# Patient Record
Sex: FEMALE | Race: BLACK OR AFRICAN AMERICAN | Employment: OTHER | URBAN - METROPOLITAN AREA
[De-identification: names, ages, dates, MRNs, and addresses within clinical notes are randomized per-mention and may not be internally consistent; named-entity substitution may affect disease eponyms.]

---

## 2020-02-06 ENCOUNTER — HOSPITAL ENCOUNTER (INPATIENT)
Facility: HOSPITAL | Age: 80
LOS: 6 days | Discharge: NURSING, REHAB OR LONG-TERM CARE | DRG: 178 | End: 2020-02-12
Attending: EMERGENCY MEDICINE | Admitting: INTERNAL MEDICINE
Payer: MEDICARE

## 2020-02-06 ENCOUNTER — APPOINTMENT (OUTPATIENT)
Dept: GENERAL RADIOLOGY | Facility: HOSPITAL | Age: 80
DRG: 178 | End: 2020-02-06
Attending: EMERGENCY MEDICINE
Payer: MEDICARE

## 2020-02-06 DIAGNOSIS — J18.9 PNEUMONIA DUE TO INFECTIOUS ORGANISM, UNSPECIFIED LATERALITY, UNSPECIFIED PART OF LUNG: Primary | ICD-10-CM

## 2020-02-06 DIAGNOSIS — J18.9 PNEUMONIA OF RIGHT LOWER LOBE DUE TO INFECTIOUS ORGANISM: ICD-10-CM

## 2020-02-06 DIAGNOSIS — I95.9 HYPOTENSION, UNSPECIFIED HYPOTENSION TYPE: ICD-10-CM

## 2020-02-06 PROBLEM — M23.51: Status: ACTIVE | Noted: 2020-01-11

## 2020-02-06 PROBLEM — J47.9 BRONCHIECTASIS (HCC): Status: ACTIVE | Noted: 2019-08-08

## 2020-02-06 PROBLEM — Z78.9 IMPAIRED MOBILITY AND ADLS: Status: ACTIVE | Noted: 2017-04-19

## 2020-02-06 PROBLEM — F31.9 BIPOLAR I DISORDER (HCC): Status: ACTIVE | Noted: 2019-10-11

## 2020-02-06 PROBLEM — G47.30 SLEEP APNEA: Status: ACTIVE | Noted: 2018-02-22

## 2020-02-06 PROBLEM — Z74.09 IMPAIRED MOBILITY AND ADLS: Status: ACTIVE | Noted: 2017-04-19

## 2020-02-06 PROBLEM — Z95.810 AICD (AUTOMATIC CARDIOVERTER/DEFIBRILLATOR) PRESENT: Status: ACTIVE | Noted: 2017-04-19

## 2020-02-06 PROBLEM — M54.16 LUMBAR RADICULOPATHY: Status: ACTIVE | Noted: 2020-01-09

## 2020-02-06 PROBLEM — Z51.81 ANTICOAGULATION MANAGEMENT ENCOUNTER: Status: ACTIVE | Noted: 2017-04-17

## 2020-02-06 PROBLEM — Z90.2 S/P LOBECTOMY OF LUNG: Status: ACTIVE | Noted: 2017-04-14

## 2020-02-06 PROBLEM — R41.89 IMPAIRED COGNITION: Status: ACTIVE | Noted: 2018-05-03

## 2020-02-06 PROBLEM — I63.9 CVA (CEREBRAL VASCULAR ACCIDENT) (HCC): Status: ACTIVE | Noted: 2020-02-06

## 2020-02-06 PROBLEM — I48.91 ATRIAL FIBRILLATION (HCC): Status: ACTIVE | Noted: 2017-04-19

## 2020-02-06 PROBLEM — R05.9 COUGH: Status: ACTIVE | Noted: 2019-02-14

## 2020-02-06 PROBLEM — E78.5 HYPERLIPEMIA: Status: ACTIVE | Noted: 2017-04-19

## 2020-02-06 PROBLEM — N18.30 STAGE 3 CHRONIC KIDNEY DISEASE (HCC): Status: ACTIVE | Noted: 2020-01-14

## 2020-02-06 PROBLEM — K59.00 CONSTIPATION: Status: ACTIVE | Noted: 2019-02-14

## 2020-02-06 PROBLEM — C34.11 MALIGNANT NEOPLASM OF UPPER LOBE OF RIGHT LUNG (HCC): Status: ACTIVE | Noted: 2017-04-14

## 2020-02-06 PROBLEM — Z79.01 ANTICOAGULATION MANAGEMENT ENCOUNTER: Status: ACTIVE | Noted: 2017-04-17

## 2020-02-06 PROBLEM — F33.8 SEASONAL AFFECTIVE DISORDER (HCC): Status: ACTIVE | Noted: 2018-04-28

## 2020-02-06 PROBLEM — C34.90 PRIMARY LUNG ADENOCARCINOMA (HCC): Status: ACTIVE | Noted: 2017-04-17

## 2020-02-06 LAB
ALBUMIN SERPL-MCNC: 3.6 G/DL (ref 3.4–5)
ALBUMIN/GLOB SERPL: 1 {RATIO} (ref 1–2)
ALP LIVER SERPL-CCNC: 94 U/L (ref 55–142)
ALT SERPL-CCNC: 17 U/L (ref 13–56)
ANION GAP SERPL CALC-SCNC: 4 MMOL/L (ref 0–18)
AST SERPL-CCNC: 18 U/L (ref 15–37)
BASOPHILS # BLD AUTO: 0.03 X10(3) UL (ref 0–0.2)
BASOPHILS NFR BLD AUTO: 0.3 %
BILIRUB SERPL-MCNC: 0.8 MG/DL (ref 0.1–2)
BILIRUB UR QL STRIP.AUTO: NEGATIVE
BUN BLD-MCNC: 13 MG/DL (ref 7–18)
BUN/CREAT SERPL: 10.4 (ref 10–20)
CALCIUM BLD-MCNC: 9.4 MG/DL (ref 8.5–10.1)
CHLORIDE SERPL-SCNC: 107 MMOL/L (ref 98–112)
CLARITY UR REFRACT.AUTO: CLEAR
CO2 SERPL-SCNC: 26 MMOL/L (ref 21–32)
COLOR UR AUTO: YELLOW
CREAT BLD-MCNC: 1.25 MG/DL (ref 0.55–1.02)
DEPRECATED RDW RBC AUTO: 42.5 FL (ref 35.1–46.3)
EOSINOPHIL # BLD AUTO: 0.12 X10(3) UL (ref 0–0.7)
EOSINOPHIL NFR BLD AUTO: 1.2 %
ERYTHROCYTE [DISTWIDTH] IN BLOOD BY AUTOMATED COUNT: 13.5 % (ref 11–15)
GLOBULIN PLAS-MCNC: 3.6 G/DL (ref 2.8–4.4)
GLUCOSE BLD-MCNC: 132 MG/DL (ref 70–99)
GLUCOSE UR STRIP.AUTO-MCNC: >=500 MG/DL
HCT VFR BLD AUTO: 46.5 % (ref 35–48)
HGB BLD-MCNC: 15.2 G/DL (ref 12–16)
IMM GRANULOCYTES # BLD AUTO: 0.03 X10(3) UL (ref 0–1)
IMM GRANULOCYTES NFR BLD: 0.3 %
KETONES UR STRIP.AUTO-MCNC: NEGATIVE MG/DL
LACTATE SERPL-SCNC: 1.1 MMOL/L (ref 0.4–2)
LEUKOCYTE ESTERASE UR QL STRIP.AUTO: NEGATIVE
LITHIUM SERPL-SCNC: 1.8 MMOL/L (ref 0.6–1.2)
LYMPHOCYTES # BLD AUTO: 2.29 X10(3) UL (ref 1–4)
LYMPHOCYTES NFR BLD AUTO: 22 %
M PROTEIN MFR SERPL ELPH: 7.2 G/DL (ref 6.4–8.2)
MCH RBC QN AUTO: 28.1 PG (ref 26–34)
MCHC RBC AUTO-ENTMCNC: 32.7 G/DL (ref 31–37)
MCV RBC AUTO: 86.1 FL (ref 80–100)
MONOCYTES # BLD AUTO: 0.87 X10(3) UL (ref 0.1–1)
MONOCYTES NFR BLD AUTO: 8.3 %
NEUTROPHILS # BLD AUTO: 7.09 X10 (3) UL (ref 1.5–7.7)
NEUTROPHILS # BLD AUTO: 7.09 X10(3) UL (ref 1.5–7.7)
NEUTROPHILS NFR BLD AUTO: 67.9 %
NITRITE UR QL STRIP.AUTO: NEGATIVE
OSMOLALITY SERPL CALC.SUM OF ELEC: 286 MOSM/KG (ref 275–295)
PH UR STRIP.AUTO: 6 [PH] (ref 4.5–8)
PLATELET # BLD AUTO: 162 10(3)UL (ref 150–450)
POTASSIUM SERPL-SCNC: 4.2 MMOL/L (ref 3.5–5.1)
PROCALCITONIN SERPL-MCNC: 0.12 NG/ML
PROT UR STRIP.AUTO-MCNC: NEGATIVE MG/DL
RBC # BLD AUTO: 5.4 X10(6)UL (ref 3.8–5.3)
RBC UR QL AUTO: NEGATIVE
SODIUM SERPL-SCNC: 137 MMOL/L (ref 136–145)
SP GR UR STRIP.AUTO: 1.02 (ref 1–1.03)
TROPONIN I SERPL-MCNC: <0.045 NG/ML (ref ?–0.04)
UROBILINOGEN UR STRIP.AUTO-MCNC: <2 MG/DL
WBC # BLD AUTO: 10.4 X10(3) UL (ref 4–11)

## 2020-02-06 PROCEDURE — 71045 X-RAY EXAM CHEST 1 VIEW: CPT | Performed by: EMERGENCY MEDICINE

## 2020-02-06 RX ORDER — PRAVASTATIN SODIUM 80 MG/1
80 TABLET ORAL NIGHTLY
Status: ON HOLD | COMMUNITY
End: 2020-02-10

## 2020-02-06 RX ORDER — LOSARTAN POTASSIUM 100 MG/1
100 TABLET ORAL DAILY
Status: ON HOLD | COMMUNITY
End: 2020-02-10

## 2020-02-06 RX ORDER — DIPHENHYDRAMINE HYDROCHLORIDE 50 MG/ML
25 INJECTION INTRAMUSCULAR; INTRAVENOUS EVERY 6 HOURS PRN
Status: DISCONTINUED | OUTPATIENT
Start: 2020-02-06 | End: 2020-02-12

## 2020-02-06 RX ORDER — MONTELUKAST SODIUM 10 MG/1
10 TABLET ORAL NIGHTLY
Status: DISCONTINUED | OUTPATIENT
Start: 2020-02-06 | End: 2020-02-12

## 2020-02-06 RX ORDER — SOTALOL HYDROCHLORIDE 80 MG/1
80 TABLET ORAL 2 TIMES DAILY
Status: DISCONTINUED | OUTPATIENT
Start: 2020-02-06 | End: 2020-02-10

## 2020-02-06 RX ORDER — FLUTICASONE PROPIONATE 50 MCG
1 SPRAY, SUSPENSION (ML) NASAL NIGHTLY
Status: ON HOLD | COMMUNITY
End: 2020-02-12

## 2020-02-06 RX ORDER — ONDANSETRON 4 MG/1
4 TABLET, ORALLY DISINTEGRATING ORAL EVERY 8 HOURS PRN
Status: DISCONTINUED | OUTPATIENT
Start: 2020-02-06 | End: 2020-02-06

## 2020-02-06 RX ORDER — CEFUROXIME AXETIL 500 MG/1
TABLET ORAL 2 TIMES DAILY
COMMUNITY
End: 2020-02-06

## 2020-02-06 RX ORDER — PRAVASTATIN SODIUM 10 MG
10 TABLET ORAL NIGHTLY
Status: DISCONTINUED | OUTPATIENT
Start: 2020-02-06 | End: 2020-02-12

## 2020-02-06 RX ORDER — ACETAMINOPHEN 325 MG/1
650 TABLET ORAL EVERY 6 HOURS PRN
Status: DISCONTINUED | OUTPATIENT
Start: 2020-02-06 | End: 2020-02-12

## 2020-02-06 RX ORDER — CARVEDILOL 12.5 MG/1
25 TABLET ORAL 2 TIMES DAILY WITH MEALS
Status: DISCONTINUED | OUTPATIENT
Start: 2020-02-07 | End: 2020-02-07

## 2020-02-06 RX ORDER — L.ACID,PARA/B.BIFIDUM/S.THERM 8B CELL
1 CAPSULE ORAL DAILY
Status: ON HOLD | COMMUNITY
End: 2020-02-12

## 2020-02-06 RX ORDER — LITHIUM CARBONATE 450 MG
450 TABLET, EXTENDED RELEASE ORAL 2 TIMES DAILY
Status: ON HOLD | COMMUNITY
End: 2020-02-10

## 2020-02-06 RX ORDER — DILTIAZEM HYDROCHLORIDE 180 MG/1
180 CAPSULE, EXTENDED RELEASE ORAL DAILY
COMMUNITY

## 2020-02-06 RX ORDER — ONDANSETRON 2 MG/ML
4 INJECTION INTRAMUSCULAR; INTRAVENOUS EVERY 6 HOURS PRN
Status: DISCONTINUED | OUTPATIENT
Start: 2020-02-06 | End: 2020-02-06

## 2020-02-06 RX ORDER — SENNA AND DOCUSATE SODIUM 50; 8.6 MG/1; MG/1
1 TABLET, FILM COATED ORAL DAILY
COMMUNITY

## 2020-02-06 RX ORDER — CARVEDILOL 25 MG/1
25 TABLET ORAL 2 TIMES DAILY WITH MEALS
Status: ON HOLD | COMMUNITY
End: 2020-02-10

## 2020-02-06 RX ORDER — HYDRALAZINE HYDROCHLORIDE 20 MG/ML
10 INJECTION INTRAMUSCULAR; INTRAVENOUS EVERY 6 HOURS PRN
Status: DISCONTINUED | OUTPATIENT
Start: 2020-02-06 | End: 2020-02-12

## 2020-02-06 RX ORDER — DILTIAZEM HYDROCHLORIDE 180 MG/1
180 CAPSULE, EXTENDED RELEASE ORAL DAILY
Status: DISCONTINUED | OUTPATIENT
Start: 2020-02-06 | End: 2020-02-12

## 2020-02-06 RX ORDER — ONDANSETRON 4 MG/1
4 TABLET, ORALLY DISINTEGRATING ORAL EVERY 8 HOURS PRN
COMMUNITY

## 2020-02-06 RX ORDER — LOSARTAN POTASSIUM 100 MG/1
100 TABLET ORAL DAILY
Status: DISCONTINUED | OUTPATIENT
Start: 2020-02-06 | End: 2020-02-07

## 2020-02-06 RX ORDER — SOTALOL HYDROCHLORIDE 80 MG/1
80 TABLET ORAL 2 TIMES DAILY
Status: ON HOLD | COMMUNITY
End: 2020-02-12

## 2020-02-06 RX ORDER — SENNA AND DOCUSATE SODIUM 50; 8.6 MG/1; MG/1
1 TABLET, FILM COATED ORAL DAILY
Status: DISCONTINUED | OUTPATIENT
Start: 2020-02-06 | End: 2020-02-12

## 2020-02-06 RX ORDER — POLYETHYLENE GLYCOL 3350 17 G/17G
17 POWDER, FOR SOLUTION ORAL DAILY
COMMUNITY

## 2020-02-06 RX ORDER — SODIUM CHLORIDE 9 MG/ML
INJECTION, SOLUTION INTRAVENOUS CONTINUOUS
Status: CANCELLED | OUTPATIENT
Start: 2020-02-06 | End: 2020-02-06

## 2020-02-06 RX ORDER — MONTELUKAST SODIUM 10 MG/1
10 TABLET ORAL NIGHTLY
COMMUNITY

## 2020-02-06 NOTE — ED PROVIDER NOTES
Patient Seen in: [facility] Emergency Department      History   Patient presents with:  Hypotension    Stated Complaint: R/O LITHIUM TOXICITY DUE TO HYPOTENSION    HPI    This is a 71-year-old female with past medical history of pneumonia on Ceftin Resp 13   Temp 98.1 °F (36.7 °C)   Temp src Temporal   SpO2 97 %   O2 Device        Current:/62   Pulse 70   Temp 98.1 °F (36.7 °C) (Temporal)   Resp 15   Ht 167.6 cm (5' 6\")   Wt 74.8 kg   SpO2 96%   BMI 26.63 kg/m²         Physical Exam    GENER -----------         ------                     CBC W/ DIFFERENTIAL[[id number]]          Abnormal            Final result                 Please view results for these tests on the individual orders.    RAINBOW DRAW BLUE   RAINBOW DRAW LAVENDER Clinical Impression:  Pneumonia due to infectious organism, unspecified laterality, unspecified part of lung  (primary encounter diagnosis)  Hypotension, unspecified hypotension type    Disposition:  Admit  2/6/2020  7:17 pm    Follow-up:  No follow-up

## 2020-02-07 ENCOUNTER — APPOINTMENT (OUTPATIENT)
Dept: CV DIAGNOSTICS | Facility: HOSPITAL | Age: 80
DRG: 178 | End: 2020-02-07
Attending: INTERNAL MEDICINE
Payer: MEDICARE

## 2020-02-07 LAB
ALBUMIN SERPL-MCNC: 3.4 G/DL (ref 3.4–5)
ALBUMIN/GLOB SERPL: 1 {RATIO} (ref 1–2)
ALLENS TEST: POSITIVE
ALP LIVER SERPL-CCNC: 90 U/L (ref 55–142)
ALT SERPL-CCNC: 17 U/L (ref 13–56)
ANION GAP SERPL CALC-SCNC: 5 MMOL/L (ref 0–18)
ARTERIAL BLD GAS O2 SATURATION: 94 % (ref 92–100)
ARTERIAL BLOOD GAS BASE EXCESS: -5.8 MMOL/L (ref ?–2)
ARTERIAL BLOOD GAS HCO3: 18.7 MEQ/L (ref 22–26)
ARTERIAL BLOOD GAS PCO2: 33 MM HG (ref 35–45)
ARTERIAL BLOOD GAS PH: 7.37 (ref 7.35–7.45)
ARTERIAL BLOOD GAS PO2: 75 MM HG (ref 80–105)
AST SERPL-CCNC: 18 U/L (ref 15–37)
ATRIAL RATE: 312 BPM
BASOPHILS # BLD AUTO: 0.03 X10(3) UL (ref 0–0.2)
BASOPHILS NFR BLD AUTO: 0.3 %
BILIRUB SERPL-MCNC: 0.9 MG/DL (ref 0.1–2)
BUN BLD-MCNC: 12 MG/DL (ref 7–18)
BUN/CREAT SERPL: 9.8 (ref 10–20)
CALCIUM BLD-MCNC: 9.1 MG/DL (ref 8.5–10.1)
CALCULATED O2 SATURATION: 95 % (ref 92–100)
CARBOXYHEMOGLOBIN: 1.1 % SAT (ref 0–3)
CHLORIDE SERPL-SCNC: 111 MMOL/L (ref 98–112)
CO2 SERPL-SCNC: 22 MMOL/L (ref 21–32)
CORTIS SERPL-MCNC: 16.8 UG/DL
CREAT BLD-MCNC: 1.23 MG/DL (ref 0.55–1.02)
CRP SERPL-MCNC: <0.29 MG/DL (ref ?–0.3)
DEPRECATED RDW RBC AUTO: 41.8 FL (ref 35.1–46.3)
EOSINOPHIL # BLD AUTO: 0.16 X10(3) UL (ref 0–0.7)
EOSINOPHIL NFR BLD AUTO: 1.6 %
ERYTHROCYTE [DISTWIDTH] IN BLOOD BY AUTOMATED COUNT: 13.4 % (ref 11–15)
GLOBULIN PLAS-MCNC: 3.5 G/DL (ref 2.8–4.4)
GLUCOSE BLD-MCNC: 100 MG/DL (ref 70–99)
GLUCOSE BLD-MCNC: 102 MG/DL (ref 70–99)
GLUCOSE BLD-MCNC: 113 MG/DL (ref 70–99)
GLUCOSE BLD-MCNC: 131 MG/DL (ref 70–99)
GLUCOSE BLD-MCNC: 258 MG/DL (ref 70–99)
GLUCOSE BLD-MCNC: 95 MG/DL (ref 70–99)
HCT VFR BLD AUTO: 45.7 % (ref 35–48)
HGB BLD-MCNC: 14.8 G/DL (ref 12–16)
IMM GRANULOCYTES # BLD AUTO: 0.04 X10(3) UL (ref 0–1)
IMM GRANULOCYTES NFR BLD: 0.4 %
IONIZED CALCIUM: 1.3 MMOL/L (ref 1.12–1.32)
LACTIC ACID ARTERIAL: <1.6 MMOL/L (ref 0.5–2)
LITHIUM SERPL-SCNC: 1.4 MMOL/L (ref 0.6–1.2)
LYMPHOCYTES # BLD AUTO: 2.68 X10(3) UL (ref 1–4)
LYMPHOCYTES NFR BLD AUTO: 27.2 %
M PROTEIN MFR SERPL ELPH: 6.9 G/DL (ref 6.4–8.2)
MCH RBC QN AUTO: 28 PG (ref 26–34)
MCHC RBC AUTO-ENTMCNC: 32.4 G/DL (ref 31–37)
MCV RBC AUTO: 86.6 FL (ref 80–100)
METHEMOGLOBIN: 0.6 % SAT (ref 0.4–1.5)
MONOCYTES # BLD AUTO: 0.87 X10(3) UL (ref 0.1–1)
MONOCYTES NFR BLD AUTO: 8.8 %
NEUTROPHILS # BLD AUTO: 6.06 X10 (3) UL (ref 1.5–7.7)
NEUTROPHILS # BLD AUTO: 6.06 X10(3) UL (ref 1.5–7.7)
NEUTROPHILS NFR BLD AUTO: 61.7 %
OSMOLALITY SERPL CALC.SUM OF ELEC: 286 MOSM/KG (ref 275–295)
PATIENT TEMPERATURE: 97.7 F
PLATELET # BLD AUTO: 144 10(3)UL (ref 150–450)
POTASSIUM BLOOD GAS: 3.9 MMOL/L (ref 3.6–5.1)
POTASSIUM SERPL-SCNC: 4 MMOL/L (ref 3.5–5.1)
Q-T INTERVAL: 470 MS
QRS DURATION: 128 MS
QTC CALCULATION (BEZET): 542 MS
R AXIS: -85 DEGREES
RBC # BLD AUTO: 5.28 X10(6)UL (ref 3.8–5.3)
SED RATE-ML: 8 MM/HR (ref 0–25)
SODIUM BLOOD GAS: 138 MMOL/L (ref 136–144)
SODIUM SERPL-SCNC: 138 MMOL/L (ref 136–145)
T AXIS: 122 DEGREES
T4 FREE SERPL-MCNC: 0.9 NG/DL (ref 0.8–1.7)
TOTAL HEMOGLOBIN: 14.8 G/DL (ref 11.7–16)
TSI SER-ACNC: 5.79 MIU/ML (ref 0.36–3.74)
VENTRICULAR RATE: 80 BPM
WBC # BLD AUTO: 9.8 X10(3) UL (ref 4–11)

## 2020-02-07 PROCEDURE — 93306 TTE W/DOPPLER COMPLETE: CPT | Performed by: INTERNAL MEDICINE

## 2020-02-07 PROCEDURE — 99223 1ST HOSP IP/OBS HIGH 75: CPT | Performed by: INTERNAL MEDICINE

## 2020-02-07 RX ORDER — LOSARTAN POTASSIUM 50 MG/1
50 TABLET ORAL DAILY
Status: DISCONTINUED | OUTPATIENT
Start: 2020-02-08 | End: 2020-02-07

## 2020-02-07 RX ORDER — DOXYCYCLINE HYCLATE 100 MG/1
100 CAPSULE ORAL EVERY 12 HOURS SCHEDULED
Status: DISCONTINUED | OUTPATIENT
Start: 2020-02-07 | End: 2020-02-08

## 2020-02-07 RX ORDER — SODIUM CHLORIDE 9 MG/ML
INJECTION, SOLUTION INTRAVENOUS ONCE
Status: COMPLETED | OUTPATIENT
Start: 2020-02-07 | End: 2020-02-07

## 2020-02-07 RX ORDER — SODIUM CHLORIDE 9 MG/ML
INJECTION, SOLUTION INTRAVENOUS CONTINUOUS
Status: DISCONTINUED | OUTPATIENT
Start: 2020-02-07 | End: 2020-02-12

## 2020-02-07 RX ORDER — IPRATROPIUM BROMIDE AND ALBUTEROL SULFATE 2.5; .5 MG/3ML; MG/3ML
3 SOLUTION RESPIRATORY (INHALATION) 3 TIMES DAILY
Status: DISCONTINUED | OUTPATIENT
Start: 2020-02-07 | End: 2020-02-10

## 2020-02-07 NOTE — OCCUPATIONAL THERAPY NOTE
OCCUPATIONAL THERAPY EVALUATION - INPATIENT     Room Number: 291/528-K  Evaluation Date: 2/7/2020  Type of Evaluation: Initial  Presenting Problem: PNA, hypotension, possible lithium toxicity    Physician Order: IP Consult to Occupational Therapy  Reason f PAIN ASSESSMENT  Ratin  Location: none reported       COGNITION  Following Commands:  follows one step commands with repetition  Safety Judgement:  decreased awareness of need for assistance and decreased awareness of need for safety  Awareness of socks. Pt attempting to don sock over old sock, multiple cues needed to doff prior to donning new socks. Dons with supervision via cross leg technique. Sit to stand CGA via RW. Ambulates to bathroom CGA.  Toilet transfer min (A) with poor safety awareness, Analysis of occupational profile, problem-focused assessments, limited treatment options    Overall Complexity LOW     OT Discharge Recommendations: Sub-acute rehabilitation(ELOS 11-13 days)       PLAN  OT Treatment Plan: Balance activities; ADL training;En

## 2020-02-07 NOTE — CM/SW NOTE
Patient admitted from [place] [id number]. SW confirmed with the patient that she is willing to go back there upon dc. Clinical updates sent. ANITA will continue to follow up.     Angela Gonzales, [address] [facility]

## 2020-02-07 NOTE — PROGRESS NOTES
NURSING ADMISSION NOTE      Patient admitted via Cart  Oriented to room 513   Safety precautions initiated. Bed in low position. Call light in reach. Daughter at bedside. VSS.

## 2020-02-07 NOTE — DIETARY NOTE
[address]     Admitting diagnosis:  Hypotension, unspecified hypotension type [I95.9]  Pneumonia due to infectious organism, unspecified laterality, unspecified part of lung [J18.9]    Ht: 167.6 cm (5' 6\")  Wt:

## 2020-02-07 NOTE — CONSULTS
[facility]  Report of Consultation    Sammi Wise Patient Status:  Inpatient    5/10/1940 MRN [id number]   [facility] 5NW-A Attending Keerthi Ambrocio MD   Hosp Day # 1 PCP Irwin Torres MD     Reason for Consultation: Hypotension bronchogenic CA (see HPI)  · History of hypertrophic obstructive cardiomyopathy and paroxysmal atrial fibrillation necessitating the implantation of a cardiac defibrillator  · Decreased left ventricular ejection fraction per care everywhere review  · Bipol daily., Disp: , Rfl:   Semaglutide,0.25 or 0.5MG/DOS, 2 MG/1.5ML Subcutaneous Solution Pen-injector, Inject 0.5 mg into the skin., Disp: , Rfl:       Review of Systems:   The patient has difficulty cooperating with a complete review of systems.   Vital sign input(s): ABGPHT, LBFVHU4S, GVIOF1C, ABGHCO3, ABGBE, TEMP, VANESSA, SITE, DEV, THGB in the last 168 hours.     Invalid input(s): [id number], CHOB      Cultures: No new/positive culture results    Radiology: Chest x-ray referenced above-consistent with patchy in

## 2020-02-07 NOTE — CONSULTS
[facility]  Cardiology Consultation    Yudith Perez Patient Status:  Inpatient    5/10/1940 MRN [id number]   [facility] 5NW-A Attending Willy Perez MD   Hosp Day # 1 PCP Ias Quiñonez MD     Reason for Consultation: Hypotension smoking. Her smoking use included cigarettes. She has never used smokeless tobacco. She reports that she does not drink alcohol or use drugs.     Allergies:  No Known Allergies    Medications: • acetaminophen (TYLENOL) 500 mg PO Tab tablet take 2 Tablets by times daily. 90 Tablet 3   • pravastatin (PRAVACHOL) 80 mg PO tablet TAKE 1 TABLET BY MOUTH NIGHTLY.  90 Tablet 3       Current Facility-Administered Medications:   •  Doxycycline Hyclate (VIBRAMYCIN) cap 100 mg, 100 mg, Oral, 2 times per day  •  ipratropiu Normocephalic, anicteric sclera. No jvd; carotids: no bruits; no tm; mouth moist.   Cardiac: irregular rate and rhythm. S1, S2 normal. no pericardial rub, S3.  2/6 ALBERT right base. Lungs: Clear without wheezes, rales, rhonchi or dullness.     Abdomen: Soft,

## 2020-02-07 NOTE — PLAN OF CARE
Pt is aox3, VSS, afebrile. Can be evry forgetful. Glasses and dentures at home. RA. PHILIP no cpap. No tele, pacemaker. Electrolyte protocol. Voids. Up with one and assist. QID accucheck. Saline locked, IV abx. BP was running low this afternoon, MD paged.  Car precautions during self-care     Outcome: Progressing       Multidisciplinary Discharge Rounds held 2/7/2020. Treatment team members present today include , , Charge Nurse, Nurse, RT, PT and Pharmacy caring for [employer].

## 2020-02-07 NOTE — H&P
[id number] [facility] Patient Status:  Inpatient    5/10/1940 MRN [id number]   [facility] 5NW-A Attending Alisa Rascon MD   [facility] Day # 1 PCP Olegario Flynn MD     Date:  2020  Date of Admission:   Never      Frequency: Never    Drug use: Never    Allergies/Medications: Allergies: No Known Allergies  carvedilol 25 MG Oral Tab, Take 25 mg by mouth 2 (two) times daily with meals. diltiazem 180 MG Oral Capsule SR 24 Hr, Take 180 mg by mouth daily.   E Exam  General Appearance:    Alert, cooperative, no distress, appears stated age   Head:    Normocephalic,  atraumatic   Neck:   Supple, symmetrical, trachea midline,        thyroid:  No enlargement/tenderness; no  JVD   Lungs:     Clear to auscultat CONCLUSION:  Patchy right lower lobe consolidation. Dictated by: Felix Camp MD on 2/06/2020 at 18:33     Approved by: Felix Camp MD on 2/06/2020 at 18:34           No results found for this visit on 02/06/20.   Assessment/Plan:     Pa prophylaxis–Xarelto    Discussed with her granddaughter in the room as well as discussed with her daughter on phone and they all agree with current management    Dehydration/renal insufficiency–IV fluids              WEAKNESS- PT OT EVAL AND TREAT  DISPOSI

## 2020-02-07 NOTE — PLAN OF CARE
Problem: Diabetes/Glucose Control  Goal: Glucose maintained within prescribed range  Description  INTERVENTIONS:  - Monitor Blood Glucose as ordered  - Assess for signs and symptoms of hyperglycemia and hypoglycemia  - Administer ordered medications to m appropriate  - Identify discharge learning needs (meds, wound care, etc)  - Arrange for interpreters to assist at discharge as needed  - Consider post-discharge preferences of patient/family/discharge partner  - Complete POLST form as appropriate  - Assess

## 2020-02-08 ENCOUNTER — APPOINTMENT (OUTPATIENT)
Dept: GENERAL RADIOLOGY | Facility: HOSPITAL | Age: 80
DRG: 178 | End: 2020-02-08
Attending: INTERNAL MEDICINE
Payer: MEDICARE

## 2020-02-08 LAB
ALBUMIN SERPL-MCNC: 3.3 G/DL (ref 3.4–5)
ALBUMIN/GLOB SERPL: 1 {RATIO} (ref 1–2)
ALP LIVER SERPL-CCNC: 86 U/L (ref 55–142)
ALT SERPL-CCNC: 18 U/L (ref 13–56)
ANION GAP SERPL CALC-SCNC: 2 MMOL/L (ref 0–18)
AST SERPL-CCNC: 21 U/L (ref 15–37)
BASOPHILS # BLD AUTO: 0.02 X10(3) UL (ref 0–0.2)
BASOPHILS NFR BLD AUTO: 0.2 %
BILIRUB SERPL-MCNC: 1 MG/DL (ref 0.1–2)
BUN BLD-MCNC: 8 MG/DL (ref 7–18)
BUN BLD-MCNC: 8 MG/DL (ref 7–18)
BUN/CREAT SERPL: 6.5 (ref 10–20)
CALCIUM BLD-MCNC: 9.3 MG/DL (ref 8.5–10.1)
CALCIUM BLD-MCNC: 9.3 MG/DL (ref 8.5–10.1)
CHLORIDE SERPL-SCNC: 112 MMOL/L (ref 98–112)
CHLORIDE SERPL-SCNC: 112 MMOL/L (ref 98–112)
CO2 SERPL-SCNC: 25 MMOL/L (ref 21–32)
CO2 SERPL-SCNC: 25 MMOL/L (ref 21–32)
CREAT BLD-MCNC: 1.24 MG/DL (ref 0.55–1.02)
CREAT BLD-MCNC: 1.24 MG/DL (ref 0.55–1.02)
DEPRECATED RDW RBC AUTO: 43.2 FL (ref 35.1–46.3)
EOSINOPHIL # BLD AUTO: 0.18 X10(3) UL (ref 0–0.7)
EOSINOPHIL NFR BLD AUTO: 2 %
ERYTHROCYTE [DISTWIDTH] IN BLOOD BY AUTOMATED COUNT: 13.7 % (ref 11–15)
GLOBULIN PLAS-MCNC: 3.3 G/DL (ref 2.8–4.4)
GLUCOSE BLD-MCNC: 111 MG/DL (ref 70–99)
GLUCOSE BLD-MCNC: 118 MG/DL (ref 70–99)
GLUCOSE BLD-MCNC: 118 MG/DL (ref 70–99)
GLUCOSE BLD-MCNC: 96 MG/DL (ref 70–99)
GLUCOSE BLD-MCNC: 97 MG/DL (ref 70–99)
GLUCOSE BLD-MCNC: 97 MG/DL (ref 70–99)
HAV IGM SER QL: 2 MG/DL (ref 1.6–2.6)
HCT VFR BLD AUTO: 43.2 % (ref 35–48)
HGB BLD-MCNC: 13.7 G/DL (ref 12–16)
IMM GRANULOCYTES # BLD AUTO: 0.02 X10(3) UL (ref 0–1)
IMM GRANULOCYTES NFR BLD: 0.2 %
L PNEUMO AG UR QL: NEGATIVE
LITHIUM SERPL-SCNC: 1 MMOL/L (ref 0.6–1.2)
LYMPHOCYTES # BLD AUTO: 2.47 X10(3) UL (ref 1–4)
LYMPHOCYTES NFR BLD AUTO: 27.1 %
M PROTEIN MFR SERPL ELPH: 6.6 G/DL (ref 6.4–8.2)
MCH RBC QN AUTO: 27.6 PG (ref 26–34)
MCHC RBC AUTO-ENTMCNC: 31.7 G/DL (ref 31–37)
MCV RBC AUTO: 86.9 FL (ref 80–100)
MONOCYTES # BLD AUTO: 0.89 X10(3) UL (ref 0.1–1)
MONOCYTES NFR BLD AUTO: 9.8 %
NEUTROPHILS # BLD AUTO: 5.54 X10 (3) UL (ref 1.5–7.7)
NEUTROPHILS # BLD AUTO: 5.54 X10(3) UL (ref 1.5–7.7)
NEUTROPHILS NFR BLD AUTO: 60.7 %
NT-PROBNP SERPL-MCNC: 908 PG/ML (ref ?–450)
OSMOLALITY SERPL CALC.SUM OF ELEC: 286 MOSM/KG (ref 275–295)
PLATELET # BLD AUTO: 141 10(3)UL (ref 150–450)
POTASSIUM SERPL-SCNC: 4.9 MMOL/L (ref 3.5–5.1)
POTASSIUM SERPL-SCNC: 4.9 MMOL/L (ref 3.5–5.1)
RBC # BLD AUTO: 4.97 X10(6)UL (ref 3.8–5.3)
SODIUM SERPL-SCNC: 139 MMOL/L (ref 136–145)
SODIUM SERPL-SCNC: 139 MMOL/L (ref 136–145)
STREP PNEUMO ANTIGEN, URINE: NEGATIVE
WBC # BLD AUTO: 9.1 X10(3) UL (ref 4–11)

## 2020-02-08 PROCEDURE — 99232 SBSQ HOSP IP/OBS MODERATE 35: CPT | Performed by: INTERNAL MEDICINE

## 2020-02-08 PROCEDURE — 71045 X-RAY EXAM CHEST 1 VIEW: CPT | Performed by: INTERNAL MEDICINE

## 2020-02-08 RX ORDER — LOSARTAN POTASSIUM 25 MG/1
25 TABLET ORAL DAILY
Status: DISCONTINUED | OUTPATIENT
Start: 2020-02-09 | End: 2020-02-10

## 2020-02-08 RX ORDER — CARVEDILOL 3.12 MG/1
3.12 TABLET ORAL 2 TIMES DAILY WITH MEALS
Status: DISCONTINUED | OUTPATIENT
Start: 2020-02-09 | End: 2020-02-10

## 2020-02-08 RX ORDER — LITHIUM CARBONATE 300 MG/1
300 TABLET, FILM COATED, EXTENDED RELEASE ORAL 2 TIMES DAILY
Status: DISCONTINUED | OUTPATIENT
Start: 2020-02-08 | End: 2020-02-12

## 2020-02-08 RX ORDER — LEVOFLOXACIN 750 MG/1
750 TABLET ORAL
Status: DISCONTINUED | OUTPATIENT
Start: 2020-02-08 | End: 2020-02-11

## 2020-02-08 NOTE — PROGRESS NOTES
[facility] Pulmonary/Critical Care Progress Note     SUBJECTIVE/Interval history: All events, procedures, notes reviewed. No acute events, she feels \"good\" today.  Denies dyspnea, cough, wheeze, chest pain/pressure, f/ 1.23* 1.24*  1.24*   GFRAA 48* 49* 48*  48*   GFRNAA 41* 42* 42*  42*   CA 9.4 9.1 9.3  9.3    138 139  139   K 4.2 4.0 4.9  4.9    111 112  112   CO2 26.0 22.0 25.0  25.0     Recent Labs   Lab 02/06/20  1750 02/07/20  0610 02/08/20  0633   RBC Doxycycline Hyclate  100 mg Oral 2 times per day   • ipratropium-albuterol  3 mL Nebulization TID   • piperacillin-tazobactam  3.375 g Intravenous Q8H   • diltiazem  180 mg Oral Daily   • Montelukast Sodium  10 mg Oral Nightly   • Pravastatin Sodium  10 mg

## 2020-02-08 NOTE — PROGRESS NOTES
· [facility]      Subjective:  Feeling better reviewed with family    Objective:  106/63  AFib v pacing  Afebrile  I/O incomplete  BUN/cr 8/1.24 cortisol normal TSH 5.790 with normal T4  Hgb 13.7    Cardiac: S1 S2 regular  Lungs: decreased BS

## 2020-02-08 NOTE — PROGRESS NOTES
[facility] Pharmacy Note:  Renal Adjustment for levofloxacin [facility])    Hilda Stroud is a 67year old female who has been prescribed levofloxacin (LEVAQUIN) 750 mg every 24 hrs.   CrCl is estimated creatinine clearance is 35 mL/min (A) (based on SCr of 1.24 m

## 2020-02-08 NOTE — SLP NOTE
ADULT SWALLOWING EVALUATION    ASSESSMENT    ASSESSMENT/OVERALL IMPRESSION:  Order received for BSE due to pt c/o difficulty swallowing. RN reported Pt did not eat her breakfast and upon asking Pt why, Pt responded \"Because I can't swallow that. \"    Jannette Fearing Precautions: Upright position  Medication Administration Recommendations: Whole in puree  Treatment Plan/Recommendations: No further inpatient SLP service warranted       HISTORY   MEDICAL HISTORY  Reason for Referral: R/O aspiration    Problem List  Princ clinically.  Videofluoroscopic Swallow Study is required to rule-out silent aspiration.)    Esophageal Phase of Swallow: No complaints consistent with possible esophageal involvement        FOLLOW UP  Treatment Plan/Recommendations: No further inpatient SLP

## 2020-02-08 NOTE — PLAN OF CARE
Problem: Impaired Swallowing  Goal: Minimize aspiration risk  Description  Interventions:  Encourage Patient to select \"SOFTER\" consistencies due to being edentulous    - Patient should be alert and upright for all feedings (90 degrees preferred)  - Of

## 2020-02-08 NOTE — PROGRESS NOTES
[facility]  Progress Note    Santiagojackie Hursting Patient Status:  Inpatient    5/10/1940 MRN [id number]   [facility] 5NW-A Attending Floyd Burton MD   Hosp Day # 2 PCP Anabell Wiggins MD         SUBJECTIVE:  Subjective:  Jack Sosa is 02/07/20  0821 02/07/20  1347 02/07/20  1710 02/07/20  2120 02/08/20  0751   PGLU 131* 95 258* 113* 111*       No results for input(s): URINE, CULTI, BLDSMR in the last 168 hours. Hospital Encounter on 02/06/20   1.  BLOOD CULTURE     Status: None (Preli lobe consolidation.     Dictated by: Carline Tomlinson MD on 2/06/2020 at 18:33     Approved by: Carline Tomlinson MD on 2/06/2020 at 18:34              Meds:     • Doxycycline Hyclate  100 mg Oral 2 times per day   • ipratropium-albuterol  3 mL Nebulizat lung  Active Problems:    Pneumonia due to infectious organism    AICD (automatic cardioverter/defibrillator) present    Atrial fibrillation (HCC)    Hypertrophic cardiomyopathy (HCC)    Hypotension, unspecified hypotension type          Plan:  Continue pr

## 2020-02-08 NOTE — PHYSICAL THERAPY NOTE
PHYSICAL THERAPY EVALUATION - INPATIENT     Room Number: 953/372-H  Evaluation Date: 2/8/2020  Type of Evaluation: Initial  Physician Order: PT Eval and Treat    Presenting Problem: Hypotension; PNA; AMS  Reason for Therapy: Mobility Dysfunction and Eder Memory \"Pt/granddaughter reports that pt lives alone and is typically independent with ADL/IADL and driving. Pt may stay with daughter and granddaughter after discharge. \" Also, per OT note pt lives in a 1 level apartment with steps.  Pt is unreliable historian difficulty does the patient currently have. ..  -   Turning over in bed (including adjusting bedclothes, sheets and blankets)?: None   -   Sitting down on and standing up from a chair with arms (e.g., wheelchair, bedside commode, etc.): A Little(due to lack recommendation, assessment findings, goals and expectations. Functional activity tolerated  Gait training  Transfer training    Patient End of Session: Up in chair;Needs met;Call light within reach;RN aware of session/findings; All patient questions and (Obs): 3-5x/week  Number of Visits to Meet Established Goals: 5    CURRENT GOALS       Goal #1 Patient is able to demonstrate A and O x 4.       Goal #2 Patient is able to demonstrate transfers EOB to/from [facility] at assistance level: modified independent     G

## 2020-02-08 NOTE — PLAN OF CARE
PROBLEM: PNA    ASSESSMENT: Pt is A&Ox3, confused and forgetful at times. VSS, afebrile. Maintaining O2 sats >94% on RA. . Tele, Atrial paced. Voids, tolerating diet no c/o n/v/d this shift. Denies pain. IVF, IV abx.  Safety and fall precautions in place OT/PT consult to assist with strengthening/mobility  - Encourage toileting schedule  Outcome: Progressing     Problem: DISCHARGE PLANNING  Goal: Discharge to home or other facility with appropriate resources  Description  INTERVENTIONS:  - Identify barrier

## 2020-02-09 ENCOUNTER — APPOINTMENT (OUTPATIENT)
Dept: GENERAL RADIOLOGY | Facility: HOSPITAL | Age: 80
DRG: 178 | End: 2020-02-09
Attending: INTERNAL MEDICINE
Payer: MEDICARE

## 2020-02-09 LAB
ALBUMIN SERPL-MCNC: 3.5 G/DL (ref 3.4–5)
ALP LIVER SERPL-CCNC: 88 U/L (ref 55–142)
ALT SERPL-CCNC: 17 U/L (ref 13–56)
AST SERPL-CCNC: 15 U/L (ref 15–37)
BASOPHILS # BLD AUTO: 0.02 X10(3) UL (ref 0–0.2)
BASOPHILS NFR BLD AUTO: 0.2 %
BILIRUB DIRECT SERPL-MCNC: 0.3 MG/DL (ref 0–0.2)
BILIRUB SERPL-MCNC: 0.9 MG/DL (ref 0.1–2)
BUN BLD-MCNC: 7 MG/DL (ref 7–18)
CALCIUM BLD-MCNC: 9 MG/DL (ref 8.5–10.1)
CHLORIDE SERPL-SCNC: 110 MMOL/L (ref 98–112)
CO2 SERPL-SCNC: 22 MMOL/L (ref 21–32)
CREAT BLD-MCNC: 1.13 MG/DL (ref 0.55–1.02)
DEPRECATED RDW RBC AUTO: 43.5 FL (ref 35.1–46.3)
EOSINOPHIL # BLD AUTO: 0.08 X10(3) UL (ref 0–0.7)
EOSINOPHIL NFR BLD AUTO: 0.9 %
ERYTHROCYTE [DISTWIDTH] IN BLOOD BY AUTOMATED COUNT: 13.5 % (ref 11–15)
GLUCOSE BLD-MCNC: 123 MG/DL (ref 70–99)
GLUCOSE BLD-MCNC: 131 MG/DL (ref 70–99)
GLUCOSE BLD-MCNC: 132 MG/DL (ref 70–99)
GLUCOSE BLD-MCNC: 149 MG/DL (ref 70–99)
HAV IGM SER QL: 1.8 MG/DL (ref 1.6–2.6)
HCT VFR BLD AUTO: 42.8 % (ref 35–48)
HGB BLD-MCNC: 13.7 G/DL (ref 12–16)
IMM GRANULOCYTES # BLD AUTO: 0.04 X10(3) UL (ref 0–1)
IMM GRANULOCYTES NFR BLD: 0.5 %
LIPASE SERPL-CCNC: 293 U/L (ref 73–393)
LITHIUM SERPL-SCNC: 1 MMOL/L (ref 0.6–1.2)
LYMPHOCYTES # BLD AUTO: 1.7 X10(3) UL (ref 1–4)
LYMPHOCYTES NFR BLD AUTO: 19.8 %
M PROTEIN MFR SERPL ELPH: 6.7 G/DL (ref 6.4–8.2)
MCH RBC QN AUTO: 28.2 PG (ref 26–34)
MCHC RBC AUTO-ENTMCNC: 32 G/DL (ref 31–37)
MCV RBC AUTO: 88.1 FL (ref 80–100)
MONOCYTES # BLD AUTO: 0.73 X10(3) UL (ref 0.1–1)
MONOCYTES NFR BLD AUTO: 8.5 %
NEUTROPHILS # BLD AUTO: 6.01 X10 (3) UL (ref 1.5–7.7)
NEUTROPHILS # BLD AUTO: 6.01 X10(3) UL (ref 1.5–7.7)
NEUTROPHILS NFR BLD AUTO: 70.1 %
PLATELET # BLD AUTO: 151 10(3)UL (ref 150–450)
POTASSIUM SERPL-SCNC: 3.9 MMOL/L (ref 3.5–5.1)
RBC # BLD AUTO: 4.86 X10(6)UL (ref 3.8–5.3)
SODIUM SERPL-SCNC: 140 MMOL/L (ref 136–145)
WBC # BLD AUTO: 8.6 X10(3) UL (ref 4–11)

## 2020-02-09 PROCEDURE — 74018 RADEX ABDOMEN 1 VIEW: CPT | Performed by: INTERNAL MEDICINE

## 2020-02-09 PROCEDURE — 99232 SBSQ HOSP IP/OBS MODERATE 35: CPT | Performed by: NURSE PRACTITIONER

## 2020-02-09 RX ORDER — ONDANSETRON 2 MG/ML
4 INJECTION INTRAMUSCULAR; INTRAVENOUS EVERY 6 HOURS PRN
Status: DISCONTINUED | OUTPATIENT
Start: 2020-02-09 | End: 2020-02-09

## 2020-02-09 RX ORDER — ONDANSETRON 2 MG/ML
INJECTION INTRAMUSCULAR; INTRAVENOUS
Status: COMPLETED
Start: 2020-02-09 | End: 2020-02-09

## 2020-02-09 RX ORDER — MAGNESIUM OXIDE 400 MG (241.3 MG MAGNESIUM) TABLET
400 TABLET ONCE
Status: COMPLETED | OUTPATIENT
Start: 2020-02-09 | End: 2020-02-09

## 2020-02-09 RX ORDER — ONDANSETRON 2 MG/ML
4 INJECTION INTRAMUSCULAR; INTRAVENOUS EVERY 6 HOURS PRN
Status: DISCONTINUED | OUTPATIENT
Start: 2020-02-09 | End: 2020-02-12

## 2020-02-09 NOTE — PLAN OF CARE
PROBLEM: PNA     ASSESSMENT: Pt is A&Ox3, confused and forgetful at times. VSS, afebrile. Maintaining O2 sats >94% on RA. . Tele, Atrial paced. Voids, tolerating diet no c/o n/v/d this shift. Denies pain. IVF.  Safety and fall precautions in place no fal appropriate  - Consider OT/PT consult to assist with strengthening/mobility  - Encourage toileting schedule  Outcome: Progressing     Problem: DISCHARGE PLANNING  Goal: Discharge to home or other facility with appropriate resources  Description  INTERVENTI Impaired Swallowing  Goal: Minimize aspiration risk  Description  Interventions:  Encourage Patient to select \"SOFTER\" consistencies due to being edentulous    - Patient should be alert and upright for all feedings (90 degrees preferred)  - Offer food an

## 2020-02-09 NOTE — PROGRESS NOTES
[facility] interrogated per NGA Hollis order. [employer] notified of interrogation & rep paged to call Yeimi Rockwell back w/report details. ADDENDUM: Two reports sent via tube station to 5th floor.

## 2020-02-09 NOTE — PROGRESS NOTES
[facility]  Progress Note    Marliisidro Darrel Patient Status:  Inpatient    5/10/1940 MRN [id number]   [facility] 5NW-A Attending Luis Acevedo MD   Hosp Day # 3 PCP Jayla Morris MD         SUBJECTIVE:  Subjective:  Gigi Rojo is 22.0 25.0  25.0 22.0   BUN 13 12 8  8 7   CREATSERUM 1.25* 1.23* 1.24*  1.24* 1.13*   CA 9.4 9.1 9.3  9.3 9.0   MG  --   --  2.0 1.8   * 102* 97  97 132*       Recent Labs   Lab 02/06/20  1750 02/07/20  0610 02/08/20  0633   ALT 17 17 18   AST 18 18 R/O LITHIUM TOXICITY DUE TO HYPOTENSION    FINDINGS:   2 lead left-sided pacemaker is noted. New para cardiac silhouette is unremarkable. Pulmonary vasculature demonstrates mild apical redistribution. Patchy right lower lobe consolidation is noted.   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

## 2020-02-09 NOTE — PLAN OF CARE
Problem: Pneumonia     Data: Patient alert and oriented x2-3, disorientation to situation. Patient denies pain. SPO2 remains greater then 92% on room air. Lung sounds diminished. No cough noted. Patient has poor appetite, snacks and meals encouraged.  Lm assessment.  - Educate pt/family on patient safety including physical limitations  - Instruct pt to call for assistance with activity based on assessment  - Modify environment to reduce risk of injury  - Provide assistive devices as appropriate  - Consider gait  - Educate and engage patient/family in tolerated activity level and precautions  - Recommend use of  RW and cga  for transfers and ambulation for 100 ft, TID. All meals up in b/s chair.     Outcome: Progressing     Problem: Impaired Swallowing  Goal:

## 2020-02-09 NOTE — ICD/PM
Cardiology    [employer] remote interrogation reviewed with Mike Suarez - looking for AFib burden. She has been in AFib since November 2019.     Kizzy Tian NP    Addendum: Mike Suarez from [address] called to state that patient has 3 months until of of bat

## 2020-02-09 NOTE — PROGRESS NOTES
· [facility]      Subjective:  Vomited earlier no recurrence now tolerating lunch.   No dyspnea    Objective:  118/65 afebrile  AFib v pacing  I/O incomplete  BUN/cr 7/1.13  Hgb 13.7    Cardiac: S1 S2 regular  Lungs: decreased BS no wheeze few

## 2020-02-09 NOTE — PLAN OF CARE
Problem: Pneumonia    Data: Patient alert and oriented x2-3, disorientation to situation. Patient denies pain. SPO2 remains greater then 92% on room air. Lung sounds diminished. No cough noted. Patient has poor appetite, snacks and meals encouraged.  IV flu assistance with activity based on assessment  - Modify environment to reduce risk of injury  - Provide assistive devices as appropriate  - Consider OT/PT consult to assist with strengthening/mobility  - Encourage toileting schedule  Outcome: Progressing and cga  for transfers and ambulation for 100 ft, TID. All meals up in b/s chair.     Outcome: Progressing     Problem: Impaired Swallowing  Goal: Minimize aspiration risk  Description  Interventions:  Encourage Patient to select \"SOFTER\" consistencies d

## 2020-02-09 NOTE — PROGRESS NOTES
[facility] Pulmonary/Critical Care Progress Note     SUBJECTIVE/Interval history: All events, procedures, notes reviewed. No acute events overnight but this morning she had gastric emesis.   She is unable to describe Labs   Lab 02/07/20  0610 02/08/20  0633 02/09/20  0609   * 97  97 132*   BUN 12 8  8 7   CREATSERUM 1.23* 1.24*  1.24* 1.13*   GFRAA 49* 48*  48* 54*   GFRNAA 42* 42*  42* 47*   CA 9.1 9.3  9.3 9.0    139  139 140   K 4.0 4.9  4.9 3.9   CL 11 Approved by: Fior Walker MD on 2/06/2020 at 18:34            • Lithium Carbonate ER  300 mg Oral BID   • levofloxacin  750 mg Oral Q48H   • carvedilol  3.125 mg Oral BID with meals   • losartan  25 mg Oral Daily   • ipratropium-albuterol  3 mL Nebu

## 2020-02-10 LAB
ALBUMIN SERPL-MCNC: 3.3 G/DL (ref 3.4–5)
ALBUMIN/GLOB SERPL: 0.9 {RATIO} (ref 1–2)
ALP LIVER SERPL-CCNC: 83 U/L (ref 55–142)
ALT SERPL-CCNC: 16 U/L (ref 13–56)
ANION GAP SERPL CALC-SCNC: 3 MMOL/L (ref 0–18)
AST SERPL-CCNC: 14 U/L (ref 15–37)
ATRIAL RATE: 220 BPM
ATRIAL RATE: 78 BPM
BASOPHILS # BLD AUTO: 0.02 X10(3) UL (ref 0–0.2)
BASOPHILS NFR BLD AUTO: 0.2 %
BILIRUB SERPL-MCNC: 0.9 MG/DL (ref 0.1–2)
BUN BLD-MCNC: 9 MG/DL (ref 7–18)
BUN BLD-MCNC: 9 MG/DL (ref 7–18)
BUN/CREAT SERPL: 8.4 (ref 10–20)
CALCIUM BLD-MCNC: 9.3 MG/DL (ref 8.5–10.1)
CALCIUM BLD-MCNC: 9.3 MG/DL (ref 8.5–10.1)
CHLORIDE SERPL-SCNC: 109 MMOL/L (ref 98–112)
CHLORIDE SERPL-SCNC: 109 MMOL/L (ref 98–112)
CO2 SERPL-SCNC: 26 MMOL/L (ref 21–32)
CO2 SERPL-SCNC: 26 MMOL/L (ref 21–32)
CREAT BLD-MCNC: 1.07 MG/DL (ref 0.55–1.02)
CREAT BLD-MCNC: 1.07 MG/DL (ref 0.55–1.02)
DEPRECATED RDW RBC AUTO: 43.4 FL (ref 35.1–46.3)
EOSINOPHIL # BLD AUTO: 0.11 X10(3) UL (ref 0–0.7)
EOSINOPHIL NFR BLD AUTO: 1.2 %
ERYTHROCYTE [DISTWIDTH] IN BLOOD BY AUTOMATED COUNT: 13.8 % (ref 11–15)
GLOBULIN PLAS-MCNC: 3.6 G/DL (ref 2.8–4.4)
GLUCOSE BLD-MCNC: 104 MG/DL (ref 70–99)
GLUCOSE BLD-MCNC: 111 MG/DL (ref 70–99)
GLUCOSE BLD-MCNC: 116 MG/DL (ref 70–99)
GLUCOSE BLD-MCNC: 116 MG/DL (ref 70–99)
GLUCOSE BLD-MCNC: 143 MG/DL (ref 70–99)
GLUCOSE BLD-MCNC: 148 MG/DL (ref 70–99)
HAV IGM SER QL: 2 MG/DL (ref 1.6–2.6)
HCT VFR BLD AUTO: 44 % (ref 35–48)
HGB BLD-MCNC: 14 G/DL (ref 12–16)
IMM GRANULOCYTES # BLD AUTO: 0.02 X10(3) UL (ref 0–1)
IMM GRANULOCYTES NFR BLD: 0.2 %
LYMPHOCYTES # BLD AUTO: 2.37 X10(3) UL (ref 1–4)
LYMPHOCYTES NFR BLD AUTO: 25.3 %
M PROTEIN MFR SERPL ELPH: 6.9 G/DL (ref 6.4–8.2)
MCH RBC QN AUTO: 27.8 PG (ref 26–34)
MCHC RBC AUTO-ENTMCNC: 31.8 G/DL (ref 31–37)
MCV RBC AUTO: 87.5 FL (ref 80–100)
MONOCYTES # BLD AUTO: 0.84 X10(3) UL (ref 0.1–1)
MONOCYTES NFR BLD AUTO: 9 %
NEUTROPHILS # BLD AUTO: 5.99 X10 (3) UL (ref 1.5–7.7)
NEUTROPHILS # BLD AUTO: 5.99 X10(3) UL (ref 1.5–7.7)
NEUTROPHILS NFR BLD AUTO: 64.1 %
OSMOLALITY SERPL CALC.SUM OF ELEC: 286 MOSM/KG (ref 275–295)
P AXIS: 91 DEGREES
PLATELET # BLD AUTO: 142 10(3)UL (ref 150–450)
POTASSIUM SERPL-SCNC: 4 MMOL/L (ref 3.5–5.1)
POTASSIUM SERPL-SCNC: 4 MMOL/L (ref 3.5–5.1)
Q-T INTERVAL: 534 MS
Q-T INTERVAL: 554 MS
QRS DURATION: 160 MS
QRS DURATION: 166 MS
QTC CALCULATION (BEZET): 584 MS
QTC CALCULATION (BEZET): 602 MS
R AXIS: -64 DEGREES
R AXIS: -71 DEGREES
RBC # BLD AUTO: 5.03 X10(6)UL (ref 3.8–5.3)
SODIUM SERPL-SCNC: 138 MMOL/L (ref 136–145)
SODIUM SERPL-SCNC: 138 MMOL/L (ref 136–145)
T AXIS: 115 DEGREES
T AXIS: 119 DEGREES
VENTRICULAR RATE: 71 BPM
VENTRICULAR RATE: 72 BPM
WBC # BLD AUTO: 9.4 X10(3) UL (ref 4–11)

## 2020-02-10 PROCEDURE — 99232 SBSQ HOSP IP/OBS MODERATE 35: CPT | Performed by: INTERNAL MEDICINE

## 2020-02-10 PROCEDURE — 93289 INTERROG DEVICE EVAL HEART: CPT | Performed by: NURSE PRACTITIONER

## 2020-02-10 RX ORDER — PRAVASTATIN SODIUM 80 MG/1
40 TABLET ORAL NIGHTLY
Qty: 30 TABLET | Refills: 0 | Status: SHIPPED | OUTPATIENT
Start: 2020-02-10

## 2020-02-10 RX ORDER — LOSARTAN POTASSIUM 25 MG/1
25 TABLET ORAL DAILY
Qty: 30 TABLET | Refills: 0 | Status: SHIPPED | OUTPATIENT
Start: 2020-02-11 | End: 2020-02-12

## 2020-02-10 RX ORDER — LOSARTAN POTASSIUM 50 MG/1
50 TABLET ORAL DAILY
Status: DISCONTINUED | OUTPATIENT
Start: 2020-02-11 | End: 2020-02-12

## 2020-02-10 RX ORDER — IPRATROPIUM BROMIDE AND ALBUTEROL SULFATE 2.5; .5 MG/3ML; MG/3ML
3 SOLUTION RESPIRATORY (INHALATION) EVERY 4 HOURS PRN
Status: DISCONTINUED | OUTPATIENT
Start: 2020-02-10 | End: 2020-02-12

## 2020-02-10 RX ORDER — LITHIUM CARBONATE 300 MG/1
300 TABLET, FILM COATED, EXTENDED RELEASE ORAL 2 TIMES DAILY
Qty: 30 TABLET | Refills: 0 | Status: SHIPPED | OUTPATIENT
Start: 2020-02-10

## 2020-02-10 RX ORDER — CARVEDILOL 6.25 MG/1
6.25 TABLET ORAL 2 TIMES DAILY WITH MEALS
Status: DISCONTINUED | OUTPATIENT
Start: 2020-02-10 | End: 2020-02-12

## 2020-02-10 RX ORDER — CARVEDILOL 3.12 MG/1
3.12 TABLET ORAL 2 TIMES DAILY WITH MEALS
Qty: 30 TABLET | Refills: 0 | Status: SHIPPED | OUTPATIENT
Start: 2020-02-10 | End: 2020-02-12

## 2020-02-10 NOTE — CDS QUERY
Pneumonia Specificity  CLINICAL DOCUMENTATION CLARIFICATION FORM    Dear Doctor:  Clinical information (provided below) indicates pneumonia.  For accurate ICD-10-CM code assignment,   PLEASE INDICATE THE TYPE/SUSPECTED TYPE OF PNEUMONIA   YOU ARE TREATING W

## 2020-02-10 NOTE — PROGRESS NOTES
[facility]  Progress Note    Yudith Perez Patient Status:  Inpatient    5/10/1940 MRN [id number]   [facility] 5NW-A Attending Willy Perez MD   Hosp Day # 4 PCP Isa Quiñonez MD         SUBJECTIVE:  Subjective:  Yudith Perez is 02/08/20  0633 02/09/20  0609 02/10/20  0618    138 139  139 140 138  138   K 4.2 4.0 4.9  4.9 3.9 4.0  4.0    111 112  112 110 109  109   CO2 26.0 22.0 25.0  25.0 22.0 26.0  26.0   BUN 13 12 8  8 7 9  9   CREATSERUM 1.25* 1.23* 1.24*  1.24* 1. Ap Portable  (cpt=71045)    Result Date: 2/6/2020  PROCEDURE:  XR CHEST AP PORTABLE  (CPT=71045)  TECHNIQUE:  AP chest radiograph was obtained. COMPARISON:  None.   INDICATIONS:  R/O LITHIUM TOXICITY DUE TO HYPOTENSION  PATIENT STATED HISTORY: (As transcri Impaired mobility and ADLs     Lumbar radiculopathy     Lung nodules     Malignant neoplasm of upper lobe of right lung (HCC)     Primary lung adenocarcinoma (HCC)     S/P lobectomy of lung     Seasonal affective disorder (HCC)     Sleep apnea     Stage 3 nursing on floor  dvt prophylaxis reviewed  PT and/or OT  Kenneth Graham MD

## 2020-02-10 NOTE — RESPIRATORY THERAPY NOTE
Pt has refused all but one duoneb treatment over the past two days. Pt states that \"she just can't do this right now. \" Will continue to monitor.

## 2020-02-10 NOTE — PROGRESS NOTES
[facility]  Progress Note    Marcus To Patient Status:  Inpatient    5/10/1940 MRN [id number]   [facility] 5NW-A Attending Khadijah Salcedo MD   Hosp Day # 4 PCP Ramesh Vasquez MD     Subjective:  Marcus To is a(n) 67year old 141.0* 151.0 142.0*     Recent Labs   Lab 02/08/20  0633 02/09/20  0609 02/10/20  0618   GLU 97  97 132* 116*  116*   BUN 8  8 7 9  9   CREATSERUM 1.24*  1.24* 1.13* 1.07*  1.07*   GFRAA 48*  48* 54* 57*  57*   GFRNAA 42*  42* 47* 50*  50*   CA 9.3  9.3 9.

## 2020-02-10 NOTE — PLAN OF CARE
PROBLEM: PNA     ASSESSMENT: Pt is A&Ox3, confused and forgetful at times. VSS, afebrile. Maintaining O2 sats >94% on RA. . Tele, Atrial paced. Voids, tolerating diet no c/o n/v/d this shift. Denies pain. IVF.  Safety and fall precautions in place no fal environment to reduce risk of injury  - Provide assistive devices as appropriate  - Consider OT/PT consult to assist with strengthening/mobility  - Encourage toileting schedule  Outcome: Progressing     Problem: DISCHARGE PLANNING  Goal: Discharge to home TID.  All meals up in b/s chair.     Outcome: Progressing     Problem: Impaired Swallowing  Goal: Minimize aspiration risk  Description  Interventions:  Encourage Patient to select \"SOFTER\" consistencies due to being edentulous    - Patient should be aler

## 2020-02-10 NOTE — PLAN OF CARE
Pt is aox3-4, VSS, afebrile. Hx stroke + bipolar. RA, PHILIP no cpap. Tele A=paced. Up with one assist. Bed alarm/ chair alarm on pt because she can be impulsive. IVF. Oral abx. Regular diet, poor appetite.  Pt is drowsy but resting comfortably in bed with collette Encourage Patient to select \"SOFTER\" consistencies due to being edentulous    - Patient should be alert and upright for all feedings (90 degrees preferred)  - Offer food and liquids at a slow rate  - Encourage small bites of food and small sips of liquid

## 2020-02-10 NOTE — PROGRESS NOTES
[facility]  Cardiology Progress Note    Mars Almaraz Patient Status:  Inpatient    5/10/1940 MRN [id number]   [facility] 5NW-A Attending Jan Briceño MD   Hosp Day # 4 PCP Maria Isabel Radford MD     Subjective:  Denies chest pain or SO 02/09/20 2102       Assessment:  · Pneumonia  · Hypotension, resolved with IVF  · Recovered CM - cardiologist at [facility]  · Afib - on xarelto.  Plans for D[facility]V with [facility]  · HCM  · [facility] [employer] - NELSY 3 months per device interrogation  ·

## 2020-02-11 ENCOUNTER — APPOINTMENT (OUTPATIENT)
Dept: GENERAL RADIOLOGY | Facility: HOSPITAL | Age: 80
DRG: 178 | End: 2020-02-11
Attending: INTERNAL MEDICINE
Payer: MEDICARE

## 2020-02-11 LAB
BASOPHILS # BLD AUTO: 0.03 X10(3) UL (ref 0–0.2)
BASOPHILS NFR BLD AUTO: 0.3 %
BUN BLD-MCNC: 10 MG/DL (ref 7–18)
CALCIUM BLD-MCNC: 9.4 MG/DL (ref 8.5–10.1)
CHLORIDE SERPL-SCNC: 107 MMOL/L (ref 98–112)
CO2 SERPL-SCNC: 26 MMOL/L (ref 21–32)
CREAT BLD-MCNC: 1.07 MG/DL (ref 0.55–1.02)
DEPRECATED RDW RBC AUTO: 41.9 FL (ref 35.1–46.3)
EOSINOPHIL # BLD AUTO: 0.08 X10(3) UL (ref 0–0.7)
EOSINOPHIL NFR BLD AUTO: 0.9 %
ERYTHROCYTE [DISTWIDTH] IN BLOOD BY AUTOMATED COUNT: 13.4 % (ref 11–15)
GLUCOSE BLD-MCNC: 102 MG/DL (ref 70–99)
GLUCOSE BLD-MCNC: 103 MG/DL (ref 70–99)
GLUCOSE BLD-MCNC: 118 MG/DL (ref 70–99)
GLUCOSE BLD-MCNC: 209 MG/DL (ref 70–99)
GLUCOSE BLD-MCNC: 98 MG/DL (ref 70–99)
HAV IGM SER QL: 1.9 MG/DL (ref 1.6–2.6)
HCT VFR BLD AUTO: 42.5 % (ref 35–48)
HGB BLD-MCNC: 13.9 G/DL (ref 12–16)
IMM GRANULOCYTES # BLD AUTO: 0.03 X10(3) UL (ref 0–1)
IMM GRANULOCYTES NFR BLD: 0.3 %
LITHIUM SERPL-SCNC: 1 MMOL/L (ref 0.6–1.2)
LYMPHOCYTES # BLD AUTO: 2.32 X10(3) UL (ref 1–4)
LYMPHOCYTES NFR BLD AUTO: 26.7 %
MCH RBC QN AUTO: 28.1 PG (ref 26–34)
MCHC RBC AUTO-ENTMCNC: 32.7 G/DL (ref 31–37)
MCV RBC AUTO: 86 FL (ref 80–100)
MONOCYTES # BLD AUTO: 0.7 X10(3) UL (ref 0.1–1)
MONOCYTES NFR BLD AUTO: 8.1 %
NEUTROPHILS # BLD AUTO: 5.52 X10 (3) UL (ref 1.5–7.7)
NEUTROPHILS # BLD AUTO: 5.52 X10(3) UL (ref 1.5–7.7)
NEUTROPHILS NFR BLD AUTO: 63.7 %
PLATELET # BLD AUTO: 142 10(3)UL (ref 150–450)
POTASSIUM SERPL-SCNC: 4.1 MMOL/L (ref 3.5–5.1)
RBC # BLD AUTO: 4.94 X10(6)UL (ref 3.8–5.3)
SODIUM SERPL-SCNC: 138 MMOL/L (ref 136–145)
WBC # BLD AUTO: 8.7 X10(3) UL (ref 4–11)

## 2020-02-11 PROCEDURE — 99232 SBSQ HOSP IP/OBS MODERATE 35: CPT | Performed by: INTERNAL MEDICINE

## 2020-02-11 PROCEDURE — 71046 X-RAY EXAM CHEST 2 VIEWS: CPT | Performed by: INTERNAL MEDICINE

## 2020-02-11 RX ORDER — SODIUM CHLORIDE 9 MG/ML
INJECTION, SOLUTION INTRAVENOUS ONCE
Status: COMPLETED | OUTPATIENT
Start: 2020-02-11 | End: 2020-02-11

## 2020-02-11 NOTE — PLAN OF CARE
Problem: Diabetes/Glucose Control  Goal: Glucose maintained within prescribed range  Description  INTERVENTIONS:  - Monitor Blood Glucose as ordered  - Assess for signs and symptoms of hyperglycemia and hypoglycemia  - Administer ordered medications to m barriers to discharge w/pt and caregiver  - Include patient/family/discharge partner in discharge planning  - Arrange for needed discharge resources and transportation as appropriate  - Identify discharge learning needs (meds, wound care, etc)  - Arrange f rate  - Encourage small bites of food and small sips of liquid  - Offer pills one at a time, and deliver with applesauce   - Order extra sauces, gravies, broths if needed to soften hard solid foods  - Discontinue feeding and notify MD (or speech pathologis

## 2020-02-11 NOTE — PHYSICAL THERAPY NOTE
PHYSICAL THERAPY TREATMENT NOTE - INPATIENT    Room Number: 877/882-W     Session: 1   Number of Visits to Meet Established Goals: 5    Presenting Problem: Hypotension; PNA; AMS    Problem List  Principal Problem:    Pneumonia due to infectious organism, side of the bed?: A Little   How much help from another person does the patient currently need. ..   -   Moving to and from a bed to a chair (including a wheelchair)?: A Little   -   Need to walk in hospital room?: A Little   -   Climbing 3-5 steps with a r previous session. At this time, Pt. presents with decreased balance, impaired strength, difficulty with gait/transfers resulting in downgrade of overall functional mobility.   Due to above deficits, Pt will benefit from continued IP PT, so that patient may

## 2020-02-11 NOTE — PROGRESS NOTES
[facility]  Cardiology Progress Note    Evaristo Eis Patient Status:  Inpatient    5/10/1940 MRN [id number]   [facility] 5NW-A Attending Mary Beck MD   Hosp Day # 5 PCP Juan J Block MD     Subjective:  Denies chest pain or SO QTC - Hold sotalol while on levaquin. Plan:   · Hold sotalol as QT is lengthening on levaquin and sotalol. · Can consider resuming after course of levaquin is completed to facilitate cardioversion.    · Continue xarelto  · Check ECG today      Jerel Alexander

## 2020-02-11 NOTE — PROGRESS NOTES
[facility]  Progress Note    Hilda Stroud Patient Status:  Inpatient    5/10/1940 MRN [id number]   [facility] 5NW-A Attending Jovon De La Rosa MD   Hosp Day # 5 PCP Ang Soto MD         SUBJECTIVE:  Subjective:  Hilda Maximus is 14.0 13.9   MCV 86.6 86.9 88.1 87.5 86.0   .0* 141.0* 151.0 142.0* 142.0*       Recent Labs   Lab 02/07/20  0610 02/08/20  0633 02/09/20  0609 02/10/20  0618 02/11/20  0649    139  139 140 138  138 138   K 4.0 4.9  4.9 3.9 4.0  4.0 4.1   CL 11 demonstrated.     Dictated by: Josee Hicks MD on 2/08/2020 at 8:32     Approved by: Josee Hicks MD on 2/08/2020 at 8:34          Xr Chest Ap Portable  (cpt=71045)    Result Date: 2/6/2020  PROCEDURE:  XR CHEST AP PORTABLE  (CPT=71045)  TECHNIQUE:  AP ch Hyperlipemia     Hypertrophic cardiomyopathy (HCC)     Impaired cognition     Impaired mobility and ADLs     Lumbar radiculopathy     Lung nodules     Malignant neoplasm of upper lobe of right lung ([address]     Primary lung adenocarcinoma (HCC)     S/P lobect ordered,  Available and radiology reviewed  All consultant notes reviewed  Discussed with nursing on floor  dvt prophylaxis reviewed  PT and/or OT  Carolina Castro MD

## 2020-02-12 VITALS
HEIGHT: 66 IN | WEIGHT: 172.38 LBS | TEMPERATURE: 99 F | RESPIRATION RATE: 16 BRPM | BODY MASS INDEX: 27.7 KG/M2 | OXYGEN SATURATION: 97 % | DIASTOLIC BLOOD PRESSURE: 67 MMHG | HEART RATE: 74 BPM | SYSTOLIC BLOOD PRESSURE: 106 MMHG

## 2020-02-12 LAB
ATRIAL RATE: 227 BPM
BASOPHILS # BLD AUTO: 0.02 X10(3) UL (ref 0–0.2)
BASOPHILS NFR BLD AUTO: 0.2 %
DEPRECATED RDW RBC AUTO: 42.4 FL (ref 35.1–46.3)
EOSINOPHIL # BLD AUTO: 0.14 X10(3) UL (ref 0–0.7)
EOSINOPHIL NFR BLD AUTO: 1.6 %
ERYTHROCYTE [DISTWIDTH] IN BLOOD BY AUTOMATED COUNT: 13.5 % (ref 11–15)
GLUCOSE BLD-MCNC: 104 MG/DL (ref 70–99)
GLUCOSE BLD-MCNC: 116 MG/DL (ref 70–99)
HCT VFR BLD AUTO: 42.4 % (ref 35–48)
HGB BLD-MCNC: 13.7 G/DL (ref 12–16)
IMM GRANULOCYTES # BLD AUTO: 0.03 X10(3) UL (ref 0–1)
IMM GRANULOCYTES NFR BLD: 0.3 %
LYMPHOCYTES # BLD AUTO: 2.27 X10(3) UL (ref 1–4)
LYMPHOCYTES NFR BLD AUTO: 26.2 %
MCH RBC QN AUTO: 28.1 PG (ref 26–34)
MCHC RBC AUTO-ENTMCNC: 32.3 G/DL (ref 31–37)
MCV RBC AUTO: 86.9 FL (ref 80–100)
MONOCYTES # BLD AUTO: 0.7 X10(3) UL (ref 0.1–1)
MONOCYTES NFR BLD AUTO: 8.1 %
NEUTROPHILS # BLD AUTO: 5.49 X10 (3) UL (ref 1.5–7.7)
NEUTROPHILS # BLD AUTO: 5.49 X10(3) UL (ref 1.5–7.7)
NEUTROPHILS NFR BLD AUTO: 63.6 %
P AXIS: 95 DEGREES
PLATELET # BLD AUTO: 146 10(3)UL (ref 150–450)
Q-T INTERVAL: 470 MS
QRS DURATION: 128 MS
QTC CALCULATION (BEZET): 538 MS
R AXIS: 133 DEGREES
RBC # BLD AUTO: 4.88 X10(6)UL (ref 3.8–5.3)
T AXIS: -58 DEGREES
VENTRICULAR RATE: 79 BPM
WBC # BLD AUTO: 8.7 X10(3) UL (ref 4–11)

## 2020-02-12 PROCEDURE — 99232 SBSQ HOSP IP/OBS MODERATE 35: CPT | Performed by: NURSE PRACTITIONER

## 2020-02-12 PROCEDURE — 90792 PSYCH DIAG EVAL W/MED SRVCS: CPT | Performed by: OTHER

## 2020-02-12 RX ORDER — CETIRIZINE HYDROCHLORIDE 10 MG/1
10 TABLET ORAL DAILY
COMMUNITY

## 2020-02-12 RX ORDER — LOSARTAN POTASSIUM 50 MG/1
50 TABLET ORAL DAILY
Qty: 30 TABLET | Refills: 1 | Status: SHIPPED | OUTPATIENT
Start: 2020-02-13

## 2020-02-12 RX ORDER — SOTALOL HYDROCHLORIDE 80 MG/1
40 TABLET ORAL 2 TIMES DAILY
COMMUNITY

## 2020-02-12 RX ORDER — CARVEDILOL 6.25 MG/1
6.25 TABLET ORAL 2 TIMES DAILY WITH MEALS
Qty: 60 TABLET | Refills: 0 | Status: SHIPPED | OUTPATIENT
Start: 2020-02-12

## 2020-02-12 NOTE — PLAN OF CARE
Problem: Diabetes/Glucose Control  Goal: Glucose maintained within prescribed range  Description  INTERVENTIONS:  - Monitor Blood Glucose as ordered  - Assess for signs and symptoms of hyperglycemia and hypoglycemia  - Administer ordered medications to m other facility with appropriate resources  Description  INTERVENTIONS:  - Identify barriers to discharge w/pt and caregiver  - Include patient/family/discharge partner in discharge planning  - Arrange for needed discharge resources and transportation as ap slow rate  - Encourage small bites of food and small sips of liquid  - Offer pills one at a time, and deliver with applesauce   - Order extra sauces, gravies, broths if needed to soften hard solid foods  - Discontinue feeding and notify MD (or speech patho

## 2020-02-12 NOTE — PLAN OF CARE
Pt is aox3-4, VSS, afebrile. Hx stroke + bipolar. RA, PHILIP no cpap. Tele A paced. Up with one assist. Bed alarm/ chair alarm on pt because she can be impulsive. IVF. Oral abx. Regular diet, poor appetite.  Pt BP ran low this evening, bolus given, BP came lise

## 2020-02-12 NOTE — PROGRESS NOTES
[facility]  Cardiology Progress Note    Awa Cochran Patient Status:  Inpatient    5/10/1940 MRN [id number]   [facility] 5NW-A Attending Scott Russell MD   Hosp Day # 6 PCP Carolina Castro MD     Subjective:  No cardiac complaints · Prolonged QTC - improved today 538.  Was 56 on admission and with review of records from [facility] this is near her baseline     Plan:   • Ok to resume sotalol- per [facility] cardiology note and patient she takes 40 mg BID  • Continue ARB, BB, Diltiazem, Xarelto

## 2020-02-12 NOTE — PROGRESS NOTES
[facility]  Progress Note    Hilda Stroud Patient Status:  Inpatient    5/10/1940 MRN [id number]   [facility] 5NW-A Attending Jovon De La Rosa MD   Hosp Day # 6 PCP Ang Soto MD     Subjective:  Hilda Stroud is a(n) 67year old 3. 3* 3.5 3.3*  --      139 140 138  138 138   K 4.9  4.9 3.9 4.0  4.0 4.1     112 110 109  109 107   CO2 25.0  25.0 22.0 26.0  26.0 26.0   ALKPHO 86 88 83  --    AST 21 15 14*  --    ALT 18 17 16  --    BILT 1.0 0.9 0.9  --    TP 6.6 6.7 6.9  - procedures  2. Lithium toxicity-level now down and lithium restarted at a lower dose, level in normal range 2/11/20  3. QT prolongation- medication interaction between levaquin and sotalal  4. History of bronchogenic CA (see HPI)  5.  History of hypertrophi

## 2020-02-12 NOTE — PROGRESS NOTES
PSYCH CONSULT    Date of Admission: 2/6/20  Date of Consult: 2/12/20  Reason for Consultation: Should she stay of Lithium or be switched to another bipolar med    Impression:  Primary Psychiatric Diagnosis:  Bipolar 1 disorder, most recently with depressiv

## 2020-02-12 NOTE — CONSULTS
[facility]  Report of Psychiatric Consultation    Anette Shaw Patient Status:  Inpatient    5/10/1940 MRN [id number]   [facility] 5NW-A Attending Maribel Peña MD   [facility] Day # 6 PCP Adilene Willard MD     Date of Admission: 20 PM    Psych Instructions:  1) Continue Lithium 300mg twice a day with food in morning and evenings for your bipolar disorder. 2) IF you are vomiting or unable to take in food and water and get dehydrated, HOLD the lithium.  Dehydration causes lithium le History: Depression    Social and Developmental History:  x 2. She has 5 children. She will be living with her daughter Makayla Cagle after rehab.      Past Medical History:   Diagnosis Date   • Bipolar 1 disorder ([facility] [address]    • Chronic knee instability, right 1-5 Units, Subcutaneous, TID PC    Review of Systems   Constitutional: Positive for malaise/fatigue. Psychiatric/Behavioral: Positive for depression. Negative for hallucinations, substance abuse and suicidal ideas. The patient is nervous/anxious.       Me

## 2020-02-12 NOTE — PROGRESS NOTES
[facility]  Progress Note    Sammi Wise Patient Status:  Inpatient    5/10/1940 MRN [id number]   [facility] 5NW-A Attending Keerthi Ambrocio MD   [facility] Day # 6 PCP Irwin Torres MD         SUBJECTIVE:  Subjective:  Sammi Wise is Soft, non-tender, bowel sounds active all four quadrants,                Extremities:    no cyanosis, icterus or edema                                  Neurologic :  General weakness, no focal deficits                                      Data Review: with pacemaker/AICD in place. Mild pulmonary vascular congestion. Tortuous and calcified aorta. Persistent right basilar opacity with probable small right pleural effusion. No new pulmonary opacity. Degenerative changes of both shoulders and spine. cardioverter/defibrillator) present     Anticoagulation management encounter     Atrial fibrillation (HCC)     Bipolar I disorder (HCC)     Bronchiectasis (HCC)     Chronic fatigue     Chronic instability of right knee involving posterior horn of lateral m Xarelto, cardiology to follow-up and adjust medications     Reactive airway disease/asthma she is on Singulair     Dyslipidemia–statin     DVT prophylaxis–Xarelto     Depression–as stated above, management of her lithium is beyond the realm of my specialit

## 2020-02-12 NOTE — CM/SW NOTE
Possible DC today after EKG, will need [facility] to transfer. [facility] placed on Will Call for possible transfer back to [facility] today, facility alerted of possible readmission today.     ADDENDUM 2.12.20 2:31 PM    Patient has been cleared

## 2020-02-12 NOTE — PROGRESS NOTES
NURSING DISCHARGE NOTE    Discharged Nursing home via Wheelchair. Accompanied by Support Staff   Belongings Taken by patient/family. Vitals stable. Belongings taken with family.

## 2020-02-12 NOTE — PLAN OF CARE
Problem: Diabetes/Glucose Control  Goal: Glucose maintained within prescribed range  Description  INTERVENTIONS:  - Monitor Blood Glucose as ordered  - Assess for signs and symptoms of hyperglycemia and hypoglycemia  - Administer ordered medications to m appropriate resources  Description  INTERVENTIONS:  - Identify barriers to discharge w/pt and caregiver  - Include patient/family/discharge partner in discharge planning  - Arrange for needed discharge resources and transportation as appropriate  - Identif Encourage small bites of food and small sips of liquid  - Offer pills one at a time, and deliver with applesauce   - Order extra sauces, gravies, broths if needed to soften hard solid foods  - Discontinue feeding and notify MD (or speech pathologist) if co

## 2020-02-13 LAB
ATRIAL RATE: 241 BPM
Q-T INTERVAL: 532 MS
QRS DURATION: 166 MS
QTC CALCULATION (BEZET): 578 MS
R AXIS: -67 DEGREES
T AXIS: 118 DEGREES
VENTRICULAR RATE: 71 BPM

## 2020-02-17 NOTE — DISCHARGE SUMMARY
[facility]  Discharge Summary    Gigi Rojo Patient Status:  Inpatient    5/10/1940 MRN [id number]   [facility] [place] 5NW-A Attending No att. providers found   [address] Day # 6 PCP Jayla Morris MD     Date of Admission: 2020    Date of COMPARISON:  None. TECHNIQUE:  Supine AP view was obtained. PATIENT STATED HISTORY: (As transcribed by Technologist)  Vomiting. CONCLUSION:    No excessive gas or stool in the GI tract. No excessive gas in the stomach. No bowel obstruction.   No opacity with probable small right pleural effusion. No new pulmonary opacity. Degenerative changes of both shoulders and spine. CONCLUSION:  No significant change.   Mild pulmonary vascular congestion and mild right basilar pneumonia or edema again d patient is stable without any acute issues that patient may be followed with her outpatient psychiatrist.  At this point I would defer lithium use to outpatient psychiatrist, if family wishes to discontinue I would request inpatient versus outpatient psych 1.8 2.0 1.9   * 97  97 132* 116*  116* 103*                 Recent Labs   Lab 02/06/20  1750 02/07/20  0610 02/08/20  0633 02/09/20  0609 02/10/20  0618   ALT 17 17 18 17 16   AST 18 18 21 15 14*   ALB 3.6 3.4 3.3* 3.5 3.3*                 Recent La TO HYPOTENSION    FINDINGS:   2 lead left-sided pacemaker is noted. New para cardiac silhouette is unremarkable. Pulmonary vasculature demonstrates mild apical redistribution. Patchy right lower lobe consolidation is noted.   No pneumothorax.       CONCL long-term current use of insulin (HCC)     Pneumonia due to infectious organism, unspecified laterality, unspecified part of lung     Hypotension, unspecified hypotension type     Principal Problem:    Pneumonia due to infectious organism, unspecified late refer to chart for details    Disposition: SNF    Discharge Condition: Stable    Discharge Medications: Discharge Medication List as of 2/12/2020  3:26 PM    CONTINUE these medications which have CHANGED    carvedilol 6.25 MG Oral Tab  Take 1 tablet (6.25 with all MD's as per their recommendation/ refer to chart for details. 3 days    Carolina Castro  2/17/2020  10:56 AM

## (undated) NOTE — IP AVS SNAPSHOT
Patient Demographics     Address  [address] [place] [address] Phone  [phone] [facility])  [phone] (Mobile) *Preferred*      Emergency Contact(s)     Name Relation Home Work Acosta Lui Daughter   [phone]      Allergies diltiazem 180 MG Cp24  Commonly known as:  CARDIZEM CD      Take 180 mg by mouth daily. Glucagon (rDNA) 1 MG Kit      Inject 1 mg into the skin once as needed.           GlycoLax Powd  Generic drug:  Polyethylene Glycol 3350      Take 17 g by mouth [id number] [place] Carbonate ER (LITHOBID) CR tab 300 mg 02/11/20 2135 Given      [id number] [place] Carbonate ER (LITHOBID) CR tab 300 mg 02/12/20 0902 Given      [id number] Montelukast Sodium (SINGULAIR) tab 10 mg 02/11/20 2135 Given      [id number] Kyliet CBC W/ DIFFERENTIAL[[id number]]          Abnormal            Final result                 Please view results for these tests on the individual orders.     Specimen Information    Type Source Collected On   Blood — 02/12/20 0701            Testing Performed lithium from outpatient psychiatrist, was sent over to ER from [facility] when she was noted to have altered mental status, lethargy. And was noted to have hypotension, dehydration, lithium toxicity.   At this point lithium was put on hold and IV losartan 100 MG Oral Tab, Take 100 mg by mouth daily. Montelukast Sodium 10 MG Oral Tab, Take 10 mg by mouth nightly. ondansetron 4 MG Oral Tablet Dispersible, Take 4 mg by mouth every 8 (eight) hours as needed for Nausea.   Pravastatin Sodium 80 MG Oral Results:     Lab Results   Component Value Date    WBC 9.8 02/07/2020    HGB 14.8 02/07/2020    HCT 45.7 02/07/2020    .0 (L) 02/07/2020    CREATSERUM 1.23 (H) 02/07/2020    BUN 12 02/07/2020     02/07/2020    K 4.0 02/07/2020     02/0 Constipation     Cough     CVA (cerebral vascular accident) ([facility] [address]     Dyslipidemia     Essential hypertension     Hyperlipemia     Hypertrophic cardiomyopathy (HCC)     Impaired cognition     Impaired mobility and ADLs     Lumbar radiculopathy     Lung Consults - MD Consult Notes      Consults signed by Katerin Buckley MD at 2/7/2020  2:32 PM     Author:  Katerin Buckley MD Service:  Cardiology Author Type:  Physician    Filed:  2/7/2020  2:32 PM Date of Service:  2/7/2020  2:18 PM Status:  Signed • Chronic knee instability, right    • Defibrillator discharge    • Depression    • Diabetes ([facility] [address]    • Hyperlipidemia    • Kidney disease    • Macular atrophy, retinal    • Malignant neoplasm of upper lobe, right bronchus or lung (HCC)    • Pneumonia    • • dilTIAZem CD (CARDIZEM CD) 180 mg PO sustained release capsule TAKE 1 CAPSULE BY MOUTH DAILY.  SWALLOW WHOLE. 90 Capsule 3   • semaglutide (OZEMPIC) 0.25 mg or 0.5 mg(2 mg/1.5 mL) SC injection inject 0.5 mg beneath the skin WEEKLY. 4 Syringe 2   • flutica /62 (BP Location: Right arm)   Pulse 70   Temp 97.4 °F (36.3 °C) (Oral)   Resp 21   Ht 5' 6\" (1.676 m)   Wt 165 lb (74.8 kg)   SpO2 98%   BMI 26.63 kg/m²[MD.2]   Temp (24hrs), Av.8 °F (36.6 °C), Min:97.4 °F (36.3 °C), Max:98.3 °F (36.8 °C)[MD.1] Active Problems:    Pneumonia due to infectious organism    AICD (automatic cardioverter/defibrillator) present    Atrial fibrillation (HCC)    Hypertrophic cardiomyopathy (HCC)    Hypotension, unspecified hypotension type[MD.2]          Recommendations: 1 • Defibrillator discharge    • Depression    • Diabetes ([address]    • Hyperlipidemia    • Kidney disease    • Macular atrophy, retinal    • Malignant neoplasm of upper lobe, right bronchus or lung ([address]    • Pneumonia    • Sinusitis    • Sleep apnea        Pa Skilled Therapy Provided:     Pt presented in[AR.1] supine[AR.2] upon PT arrival. Pt willing to participate in session, working towards increased functional mobility and independence. [AR.1]  No family present[AR.2].   Discussed GOC, and short term/long te PT Treatment Plan: Endurance; Patient education;Gait training;Transfer training;Balance training;Stair training  Rehab Potential : Good  Frequency (Obs): 3-5x/week    CURRENT GOALS[AR.1]     Goal #1 Patient is able to demonstrate A and O x 4.       Goal #2 Past med hx significant for lung cancer status post resection, history of hypertrophic obstructive cardiomyopathy and paroxysmal atrial fibrillation needing cardiac defibrillator, history of decreased ejection fraction, Bipolar disorder, type 2 diabetes wi AICD (automatic cardioverter/defibrillator) present    Atrial fibrillation (HCC)    Hypertrophic cardiomyopathy (HCC)    Hypotension, unspecified hypotension type      Past Medical History  Past Medical History:   Diagnosis Date   • Bipolar 1 disorder (H Oneal Mariano MS CCC-SLP/L, pager [phone]  Speech-LanguagePathologist[LK.1]      Electronically signed by ASA Domingo on 2/8/2020 11:48 AM   Attribution Key    LK. 1 - ASA Domingo on 2/8/2020 11:32 AM  DONNA. 2 - ASA Domingo on 2/8/2020 11:48 trials of all trialed consistencies with no overt clinical s/s aspiration observed or suspected. No evidence of oral residuals. Recommend con't PO diet as tolerated. Encourage patient to select \"SOFTER\" consistencies as Pt is edentulous.   Consider pil effusion. No new pulmonary   opacity. Degenerative changes of both shoulders and spine. OBJECTIVE   ORAL MOTOR EXAMINATION  Dentition: Edentulous  Symmetry: Within Functional Limits  Strength:  Within Functional Limits     Range of Motion: Within Fun 2/9 NOC: \"go home tomorrow\"  2/10 am: speak with daughter   2/10 NOC: stay calm and sleep   2/11 AM: go home   2/11 NOC: stay safe and sleep well   2/12  AM: to discharge  Interventions:   - BP medication  -Frequent rounding  -Cluster care  -Monitor VS

## (undated) NOTE — IP AVS SNAPSHOT
[address]            (For Outpatient Use Only) Initial Admit Date: 2/6/2020   Inpt/Obs Admit Date: Inpt: 2/6/20 / Obs: N/A   Discharge Date:    Hospital Acct:  [de-identified]   MRN: [de-identified]   CSN: [id number]   CEID: [id number] Subscriber Name:  Meseret Fabi :    Subscriber ID:  Pt Rel to Subscriber:    Hospital Account Financial Class: Medicare    2020